# Patient Record
Sex: FEMALE | Employment: UNEMPLOYED | URBAN - METROPOLITAN AREA
[De-identification: names, ages, dates, MRNs, and addresses within clinical notes are randomized per-mention and may not be internally consistent; named-entity substitution may affect disease eponyms.]

---

## 2020-07-20 ENCOUNTER — TELEPHONE (OUTPATIENT)
Dept: UROLOGY | Facility: CLINIC | Age: 50
End: 2020-07-20

## 2020-07-20 NOTE — TELEPHONE ENCOUNTER
Attempted to call and offer an appointment to pt with Dr. Zaman for dysuria and suprapubic pain.     Phone number in our system is not valid.    Message routed to scheduling team with the number from her [facility] chart provided.    Offering a virtual visit for a cystoscopy consult.

## 2020-07-22 ENCOUNTER — TELEPHONE (OUTPATIENT)
Dept: UROLOGY | Facility: CLINIC | Age: 50
End: 2020-07-22

## 2020-07-22 ENCOUNTER — APPOINTMENT (OUTPATIENT)
Dept: INTERPRETER SERVICES | Facility: CLINIC | Age: 50
End: 2020-07-22

## 2020-07-22 NOTE — TELEPHONE ENCOUNTER
----- Message from Gely Regan CMA sent at 7/20/2020  9:13 AM CDT -----  Regarding: Please call to schedule  Please call Leah to schedule an appointment with Dr. Zaman, can be virtual first (cysto consult) - dysuria, and suprapubic pain.     The number we have in the chart is invalid, the number in her [facility] chart is [phone].    She will need a .    Thank you,  Gely Regan CMA

## 2020-07-22 NOTE — TELEPHONE ENCOUNTER
Left message for pt to call and schedule a virtual visit with Dr. Zaman for dysuria, and suprapubic pain (cysto consult).

## 2020-07-24 ENCOUNTER — APPOINTMENT (OUTPATIENT)
Dept: INTERPRETER SERVICES | Facility: CLINIC | Age: 50
End: 2020-07-24

## 2020-07-27 ENCOUNTER — APPOINTMENT (OUTPATIENT)
Dept: INTERPRETER SERVICES | Facility: CLINIC | Age: 50
End: 2020-07-27

## 2020-08-04 ENCOUNTER — APPOINTMENT (OUTPATIENT)
Dept: INTERPRETER SERVICES | Facility: CLINIC | Age: 50
End: 2020-08-04

## 2025-07-17 ENCOUNTER — APPOINTMENT (OUTPATIENT)
Dept: INTERPRETER SERVICES | Facility: CLINIC | Age: 55
End: 2025-07-17